# Patient Record
(demographics unavailable — no encounter records)

---
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
## 2025-01-10 NOTE — PHYSICAL EXAM
[Person] : oriented to person [Place] : oriented to place [Time] : oriented to time [Concentration Intact] : normal concentrating ability [Visual Intact] : visual attention was ~T not ~L decreased [Naming Objects] : no difficulty naming common objects [Repeating Phrases] : no difficulty repeating a phrase [Writing A Sentence] : no difficulty writing a sentence [Fluency] : fluency intact [Comprehension] : comprehension intact [Reading] : reading intact [Past History] : adequate knowledge of personal past history [Cranial Nerves Optic (II)] : visual acuity intact bilaterally,  visual fields full to confrontation, pupils equal round and reactive to light [Cranial Nerves Oculomotor (III)] : extraocular motion intact [Cranial Nerves Trigeminal (V)] : facial sensation intact symmetrically [Cranial Nerves Vestibulocochlear (VIII)] : hearing was intact bilaterally [Cranial Nerves Facial (VII)] : face symmetrical [Cranial Nerves Glossopharyngeal (IX)] : tongue and palate midline [Cranial Nerves Accessory (XI - Cranial And Spinal)] : head turning and shoulder shrug symmetric [Cranial Nerves Hypoglossal (XII)] : there was no tongue deviation with protrusion [Motor Tone] : muscle tone was normal in all four extremities [No Muscle Atrophy] : normal bulk in all four extremities [Motor Strength] : muscle strength was normal in all four extremities [Abnormal Walk] : normal gait [Sensation Tactile Decrease] : light touch was intact [Balance] : balance was intact [2+] : Ankle jerk left 2+ [Past-pointing] : there was no past-pointing [Tremor] : no tremor present [Plantar Reflex Right Only] : normal on the right [Plantar Reflex Left Only] : normal on the left [FreeTextEntry6] : Limited range of motion in the neck horizontally and vertically, spasm of bilateral trapezii as well as cervical paraspinals.  Pain limited weakness in the right arm, right arm in sling

## 2025-01-10 NOTE — ASSESSMENT
[FreeTextEntry1] : Traumatic/tension type headache - MRI of the brain without trever, as well as MRI of the cervical spine as scheduled - Physical therapy for the neck which would be both diagnostic and therapeutic - A trial of tizanidine.  Patient warned the potential side effect of medication and she reported understanding   Right shoulder injury - MRI of the right shoulder as scheduled - Follow-up with Ortho  A letter given for patient's to stay out of work till next visit
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
## 2025-01-24 NOTE — IMAGING
[de-identified] : Lyly young woman sits comfortably my office in no distress.  She is able to move her neck without undue discomfort.  Physical examination: Cervical spine: Unrestricted range of motion.  Some mild paraspinal spasming.  Some midline tenderness about the lower portions of her cervical spine.  Range of motion is full.  Motor strength is 5 out of 5 throughout.  Negative Spurling's and Lhmerte's test.  Minimal spasming of the trapezius at the base of the muscle and of the paraspinal musculature.  Right shoulder: Mildly abnormal impingement sign.  Mildly abnormal Acosta test.  Negative speeds test.  Equivocal [place]'s test.  Discomfort with an apprehension test but I do not think this is true apprehension test.  She does have tenderness along the muscle bellies of her supraspinatus and infraspinatus muscles.  There is some tenderness along the acromial arch and acromioclavicular joint but negative cross chest test.  Normal lift off test.  No axilla lymph nodes.  Range of motion is full.  Discomfort at extremes of range of motion.  Left wrist: Tenderness to palpation along the ulnar head particularly over the dorsal surface of the ulnar head.  Ulnar deviation elicits discomfort about the TFCC.  Passive palmar flexion also elicits discomfort over the ECU tendon.  Passive dorsiflexion does not elicit as much discomfort over the FCU tendon over the ulnar head.  Shucking of the DRUJ joint does not elicit undue discomfort and she really has no tenderness along the radiocarpal joint.  She is able to make good .  No thenar or hypothenar wasting and normal light sensation about her fingers.  MRI of right shoulder reviewed.  Agree for the most part with the radiology report essentially demonstrates rotator cuff and shoulder girdle muscle strain with inflammation of the tendons of her rotator cuff and biceps tendon.  MRI of her cervical spine reviewed.  Agree with findings.  I am not sure I agree with the small annular tear.  It does occur but I am not sure this is clinically relevant.
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
## 2025-02-24 NOTE — PHYSICAL EXAM
[Person] : oriented to person [Place] : oriented to place [Time] : oriented to time [Concentration Intact] : normal concentrating ability [Visual Intact] : visual attention was ~T not ~L decreased [Naming Objects] : no difficulty naming common objects [Repeating Phrases] : no difficulty repeating a phrase [Writing A Sentence] : no difficulty writing a sentence [Fluency] : fluency intact [Comprehension] : comprehension intact [Reading] : reading intact [Past History] : adequate knowledge of personal past history [Cranial Nerves Optic (II)] : visual acuity intact bilaterally,  visual fields full to confrontation, pupils equal round and reactive to light [Cranial Nerves Oculomotor (III)] : extraocular motion intact [Cranial Nerves Trigeminal (V)] : facial sensation intact symmetrically [Cranial Nerves Facial (VII)] : face symmetrical [Cranial Nerves Vestibulocochlear (VIII)] : hearing was intact bilaterally [Cranial Nerves Glossopharyngeal (IX)] : tongue and palate midline [Cranial Nerves Accessory (XI - Cranial And Spinal)] : head turning and shoulder shrug symmetric [Cranial Nerves Hypoglossal (XII)] : there was no tongue deviation with protrusion [Motor Tone] : muscle tone was normal in all four extremities [Motor Strength] : muscle strength was normal in all four extremities [No Muscle Atrophy] : normal bulk in all four extremities [Sensation Tactile Decrease] : light touch was intact [Balance] : balance was intact [2+] : Ankle jerk left 2+ [Past-pointing] : there was no past-pointing [Tremor] : no tremor present [Plantar Reflex Right Only] : normal on the right [Plantar Reflex Left Only] : normal on the left [FreeTextEntry6] : Limited range of motion in the neck horizontally and vertically, spasm of bilateral trapezii as well as cervical paraspinals.  Pain limited weakness in the right arm, arm in sling [Hearing Threshold Finger Rub Not [place]] : hearing was normal [FreeTextEntry1] : tenderness to trapezius groups  [Abnormal Walk] : normal gait [Involuntary Movements] : no involuntary movements were seen
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
## 2025-03-28 NOTE — ASSESSMENT
[FreeTextEntry1] : 38-year-old woman now just about 3 months nonoperative management of a cervical strain and right shoulder strain.  The shoulder strain is really around the periscapular muscles of her shoulder blade.  She seems to be responding to physical therapy and I like her to continue with the physical therapy for her neck and her shoulder.  I would add to it regular use of nonsteroidal anti-inflammatory medication such as meloxicam when she has discomfort.  Patient should not take ibuprofen while she is on the meloxicam.  NSAID precautions discussed.  Regarding return to work I do not think that she has recovered sufficiently to carry out all of her duties working.  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  I think she can return to work if she carries to activities that require less than 15 pound lifting or pushing and no prolonged activities with activities at or above shoulder height such as driving for more than 15 to 20 minutes at a time.  Will reassess her in 6 weeks to see how she is getting along with physical therapy.  Would anticipate elimination of restrictions in 6 to 12 weeks.  All questions were answered to the patient's satisfaction.
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
## 2025-03-28 NOTE — IMAGING
[de-identified] : Pleasant young woman sits comfortably my office in no distress.  Physical examination: Cervical spine: Unrestricted range of motion of her cervical spine.  She does have some mild discomfort as she extends maximally.  Negative limb Marites and Spurling's test.  Motor strength is 5 out of 5 about her upper extremities.  There is no evidence of atrophy.  Normal sensation.  Right shoulder: Negative impingement sign.  Negative Speed's Test.  Negative Yergason test negative Acosta test.  Negative apprehension test.  She does have residual tenderness palpation along the periscapular muscles along the spinal border of her scapula.  Provocative testing these muscle elicits some discomfort.  There is no winging of her scapula.  Normal light sensation Axon nerve distribution.  No exam lymph nodes.

## 2025-04-30 NOTE — WORK
[Does not reveal pre-existing condition(s) that may affect treatment/prognosis] : does not reveal pre-existing condition(s) that may affect treatment/prognosis [I provided the services listed above] :  I provided the services listed above. [Partial] : partial [Can return to work with limitations on ______] : can return to work with limitations on [unfilled] [FreeTextEntry1] : fair [FreeTextEntry3] : lg
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
## 2025-05-06 NOTE — IMAGING
[de-identified] : Pleasant young woman sits reasonably comfortably in my office in no distress.    Physical examination: Cervical spine and shoulders: Range of motion of her neck and upper back is unrestricted.  Unrestricted range of motion of her shoulder.  She still has paraspinal spasming primarily in the Ava scapula and rhomboid muscles.  Some mild crepitus with range of motion of her right shoulder with forward flexion and rotation forward.  The crepitus is mostly over the anterior aspects of her shoulder.  Does not have focal tenderness in the area.  Does not have tenderness over the acromial arch or clavicle.  No new swelling.  No evidence of atrophy.  Normal sensation along her shoulders and chest wall and axillary nerve distribution.
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
